# Patient Record
Sex: MALE | Race: WHITE | NOT HISPANIC OR LATINO | Employment: FULL TIME
[De-identification: names, ages, dates, MRNs, and addresses within clinical notes are randomized per-mention and may not be internally consistent; named-entity substitution may affect disease eponyms.]

---

## 2022-07-14 ENCOUNTER — TRANSCRIBE ORDERS (OUTPATIENT)
Dept: OTHER | Age: 67
End: 2022-07-14

## 2022-07-14 DIAGNOSIS — M54.41 CHRONIC RIGHT-SIDED LOW BACK PAIN WITH RIGHT-SIDED SCIATICA: Primary | ICD-10-CM

## 2022-07-14 DIAGNOSIS — G89.29 CHRONIC RIGHT-SIDED LOW BACK PAIN WITH RIGHT-SIDED SCIATICA: Primary | ICD-10-CM

## 2022-07-27 ENCOUNTER — THERAPY VISIT (OUTPATIENT)
Dept: PHYSICAL THERAPY | Facility: CLINIC | Age: 67
End: 2022-07-27
Attending: FAMILY MEDICINE
Payer: COMMERCIAL

## 2022-07-27 DIAGNOSIS — G89.29 CHRONIC RIGHT-SIDED LOW BACK PAIN WITH RIGHT-SIDED SCIATICA: ICD-10-CM

## 2022-07-27 DIAGNOSIS — M54.41 CHRONIC RIGHT-SIDED LOW BACK PAIN WITH RIGHT-SIDED SCIATICA: ICD-10-CM

## 2022-07-27 PROCEDURE — 97110 THERAPEUTIC EXERCISES: CPT | Mod: GP | Performed by: PHYSICAL THERAPIST

## 2022-07-27 PROCEDURE — 97161 PT EVAL LOW COMPLEX 20 MIN: CPT | Mod: GP | Performed by: PHYSICAL THERAPIST

## 2022-07-27 NOTE — PROGRESS NOTES
Physical Therapy Initial Evaluation  Subjective:  The history is provided by the patient. No  was used.   Therapist Generated HPI Evaluation  Problem details: A couple of year history of low back pain, possibly from lifting,  The pain comes and goes.  Now the pain is constant for the last year.   If I walk for 2 blocks, I have increase in pain in the leg. .         Type of problem:  Lumbar.    This is a chronic condition.  Condition occurred with:  Insidious onset.  Where condition occurred: for unknown reasons.  Patient reports pain:  Lower lumbar spine and lumbar spine right.  Pain is described as aching and is constant.  Pain radiates to:  Gluteals right, thigh right and knee right. Pain is worse in the P.M. (stiffness in the AM ).  Since onset symptoms are unchanged.  Symptoms are exacerbated by walking, standing and lifting  and relieved by rest.  Special tests included:  X-ray (1 year ago ).  Previous treatment includes physical therapy (left frozen shoulder).   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  Home/work barriers: apartment, lives with wife.    Patient Health History  William Pennington being seen for right low back and right leg pain.     Problem began: 7/14/2022.   Problem occurred: unknown   Pain is reported as 3/10 (walking 5-6/10) on pain scale.  General health as reported by patient is good.  Pertinent medical history includes: cancer and chemical dependency (hodgekin's lymphoma).   Red flags:  None as reported by patient.  Medical allergies: none. Other medical allergies details: unknown.   Surgeries include:  None.    Current medications:  None. Other medications details: vitamin.    Current occupation is IT  support .   Primary job tasks include:  Driving, computer work, lifting/carrying, prolonged sitting and repetitive tasks.                                    Objective:  Standing Alignment:    Cervical/Thoracic:  Forward head  (poor sitting posture)  Shoulder/UE:  Rounded shoulders (internal rotation of bilateral shoulders)  Lumbar:  High lumbar lordosis          General Deviations:  Toe out L and toe out R  Gait:      Deviations:  Hip:  Excessive flexion L and excessive flexion R    Flexibility/Screens:   Positive screens:  LumbarNegative screens: Hip (tightness)     Lower Extremity:  Decreased left lower extremity flexibility:Hip Flexors; Quadriceps and Hamstrings    Decreased right lower extremity flexibility:  Hip Flexors; Quadriceps and Hamstrings  Spine:  Decreased left spine flexibility:  Quadratus Lumborum    Decreased right spine flexibility:  Quadratus Lumborum             Lumbar/SI Evaluation  ROM:    AROM Lumbar:   Flexion:        Toes increase in right leg pain   Ext:                    Minimal-moderate movement  decrease in pain    Side Bend:        Left:     Right:   Rotation:           Left:     Right:   Side Glide:        Left:     Right:           Lumbar Myotomes:    T12-L3 (Hip Flex):  Left: 5    Right: 5  L2-4 (Quads):  Left:  5    Right:  5  L4 (Ankle DF):  Left:  5    Right:  5    S1 (Toe Raise):  Left: 5    Right: 5        Neural Tension/Mobility:      Left side:Slump  negative.     Right side:   Slump  negative.   Lumbar Palpation:    Tenderness present at Left:    Quadratus Lumborum; Erector Spinae and Vertebral  Tenderness present at Right: Quadratus Lumborum; Erector Spinae and Vertebral    Lumbar Provocation:    Left positive with:  Mobility and PROM hip (tightness with hip extension )  Right positive with: Mobility and PROM hip (extreme hip flexion tightness)  Spinal Segmental Conclusions:     Level: Hypo noted at L2, L3, L4 and L5                                        Hip Evaluation  Hip PROM:      Extension: Left: unable to get hip straight    Right: unable to get get the hip straight                                        General     ROS    Assessment/Plan:    Patient is a 66 year old male with lumbar  complaints.    Patient has the following significant findings with corresponding treatment plan.                Diagnosis 1:  Right low lumbar with hip extension   Pain -  manual therapy, self management, education, directional preference exercise and home program  Decreased ROM/flexibility - manual therapy, therapeutic exercise, therapeutic activity and home program  Impaired balance - neuro re-education, therapeutic activities and home program  Impaired gait - gait training and home program  Impaired muscle performance - neuro re-education and home program  Decreased function - therapeutic activities and home program  Impaired posture - neuro re-education and home program    Therapy Evaluation Codes:   Cumulative Therapy Evaluation is: Low complexity.    Previous and current functional limitations:  (See Goal Flow Sheet for this information)    Short term and Long term goals: (See Goal Flow Sheet for this information)     Communication ability:  Patient appears to be able to clearly communicate and understand verbal and written communication and follow directions correctly.  Treatment Explanation - The following has been discussed with the patient:   RX ordered/plan of care  This patient would benefit from PT intervention to resume normal activities.   Rehab potential is good.    Frequency:  1 X week, once daily  Duration:  for 4 weeks and 1x/ every other week for 4 weeks   Discharge Plan:  Achieve all LTG.  Independent in home treatment program.    Please refer to the daily flowsheet for treatment today, total treatment time and time spent performing 1:1 timed codes.

## 2022-07-28 PROBLEM — M54.41 CHRONIC RIGHT-SIDED LOW BACK PAIN WITH RIGHT-SIDED SCIATICA: Status: ACTIVE | Noted: 2022-07-28

## 2022-07-28 PROBLEM — G89.29 CHRONIC RIGHT-SIDED LOW BACK PAIN WITH RIGHT-SIDED SCIATICA: Status: ACTIVE | Noted: 2022-07-28

## 2022-07-29 NOTE — PROGRESS NOTES
[facility]    OUTPATIENT Physical Therapy ORTHOPEDIC EVALUATION  PLAN OF TREATMENT FOR OUTPATIENT REHABILITATION  (COMPLETE FOR INITIAL CLAIMS ONLY)  Patient's Last Name, First Name, M.I.  YOB: 1955  William Pennington    Provider s Name:  [facility]   Medical Record No.  [id number]   Start of Care Date:  07/27/22   Onset Date:   07/14/22   Type:     _X__PT   ___OT Medical Diagnosis:    Encounter Diagnosis   Name Primary?    Chronic right-sided low back pain with right-sided sciatica         Treatment Diagnosis:  right low back        Goals:     07/27/22 0500   Body Part   Goals listed below are for right low back and right leg pain   Goal #1   Goal #1 ambulation   Previous Functional Level No restrictions   Current Functional Level Blocks patient can walk   Performance Level 2 , pl 5-6/10   STG Target Performance Blocks patient will be able to  walk   Performance Level 4 , pl 3-4/10   Rationale for safe household ambulation;for safe outdoor household ambulation;for safe community ambulation   Due Date 08/24/22    LTG Target Performance Blocks patient will be able to walk   Performance Level 8, pl 2-3/10   Rationale for safe household ambulation;for safe outdoor household ambulation;for safe community ambulation;to maintain proper body mechanics/posture while ambulating to avoid additional compensatory injury due to improper gait mechanics   Due Date 09/21/22       Therapy Frequency:  1x/ week the progress to every other week  Predicted Duration of Therapy Intervention:  8 weeks    Aretha Barnett, PT                 I CERTIFY THE NEED FOR THESE SERVICES FURNISHED UNDER        THIS PLAN OF TREATMENT AND WHILE UNDER MY CARE .             Physician Signature               Date    X_____________________________________________________                          Certification Date From:  07/27/22   Certification Date To:  09/21/22    Referring Provider:  Kosta Stiles    Initial Assessment        See Epic Evaluation SOC Date: 07/27/22

## 2022-08-12 ENCOUNTER — THERAPY VISIT (OUTPATIENT)
Dept: PHYSICAL THERAPY | Facility: CLINIC | Age: 67
End: 2022-08-12
Payer: COMMERCIAL

## 2022-08-12 DIAGNOSIS — M54.41 CHRONIC RIGHT-SIDED LOW BACK PAIN WITH RIGHT-SIDED SCIATICA: Primary | ICD-10-CM

## 2022-08-12 DIAGNOSIS — G89.29 CHRONIC RIGHT-SIDED LOW BACK PAIN WITH RIGHT-SIDED SCIATICA: Primary | ICD-10-CM

## 2022-08-12 PROCEDURE — 97110 THERAPEUTIC EXERCISES: CPT | Mod: GP | Performed by: PHYSICAL THERAPIST

## 2022-08-12 PROCEDURE — 97112 NEUROMUSCULAR REEDUCATION: CPT | Mod: GP | Performed by: PHYSICAL THERAPIST

## 2022-08-12 PROCEDURE — 97140 MANUAL THERAPY 1/> REGIONS: CPT | Mod: GP | Performed by: PHYSICAL THERAPIST

## 2022-08-19 ENCOUNTER — THERAPY VISIT (OUTPATIENT)
Dept: PHYSICAL THERAPY | Facility: CLINIC | Age: 67
End: 2022-08-19
Payer: COMMERCIAL

## 2022-08-19 DIAGNOSIS — M54.41 CHRONIC RIGHT-SIDED LOW BACK PAIN WITH RIGHT-SIDED SCIATICA: Primary | ICD-10-CM

## 2022-08-19 DIAGNOSIS — G89.29 CHRONIC RIGHT-SIDED LOW BACK PAIN WITH RIGHT-SIDED SCIATICA: Primary | ICD-10-CM

## 2022-08-19 PROCEDURE — 97140 MANUAL THERAPY 1/> REGIONS: CPT | Mod: GP | Performed by: PHYSICAL THERAPIST

## 2022-08-19 PROCEDURE — 97112 NEUROMUSCULAR REEDUCATION: CPT | Mod: GP | Performed by: PHYSICAL THERAPIST

## 2022-08-19 PROCEDURE — 97110 THERAPEUTIC EXERCISES: CPT | Mod: GP | Performed by: PHYSICAL THERAPIST

## 2022-09-02 ENCOUNTER — THERAPY VISIT (OUTPATIENT)
Dept: PHYSICAL THERAPY | Facility: CLINIC | Age: 67
End: 2022-09-02
Payer: COMMERCIAL

## 2022-09-02 DIAGNOSIS — M54.41 CHRONIC RIGHT-SIDED LOW BACK PAIN WITH RIGHT-SIDED SCIATICA: Primary | ICD-10-CM

## 2022-09-02 DIAGNOSIS — G89.29 CHRONIC RIGHT-SIDED LOW BACK PAIN WITH RIGHT-SIDED SCIATICA: Primary | ICD-10-CM

## 2022-09-02 PROCEDURE — 97112 NEUROMUSCULAR REEDUCATION: CPT | Mod: GP | Performed by: PHYSICAL THERAPIST

## 2022-09-02 PROCEDURE — 97140 MANUAL THERAPY 1/> REGIONS: CPT | Mod: GP | Performed by: PHYSICAL THERAPIST

## 2022-09-02 PROCEDURE — 97110 THERAPEUTIC EXERCISES: CPT | Mod: GP | Performed by: PHYSICAL THERAPIST

## 2022-09-02 NOTE — LETTER
[facility]  [address]  [address]  [place] [address]  [phone]    2022    Re: William Pennington   :   1955  MRN:  [id number]   REFERRING PHYSICIAN:   Kosta MILIAN [facility]    Date of Initial Evaluation:  2022  Visits:  Rxs Used: 4  Reason for Referral:  Chronic right-sided low back pain with right-sided sciatica     DISCHARGE REPORT    Progress reporting period is from 2022 to 2022.       SUBJECTIVE  Subjective changes noted by patient:   Overall still getting better.  I am about 75% of my normal.  I don't have pain at rest.     Current Pain level: 2/10.      Initial Pain level: 5/10.   Changes in function:  Yes (See Goal flowsheet attached for changes in current functional level)  Adverse reaction to treatment or activity: None    OBJECTIVE  Changes noted in objective findings:  Yes, TROM flexion toes, extension moderate , SB WFL.  Increase awareness of posture and spine neutral.  TA 1/5, Tightness in L/E especially hip flexors.  Walking decrease in hip extension, watching back position.  Discussed with patient progression of exercises and home exercise program.     ASSESSMENT/PLAN  Updated problem list and treatment plan: Diagnosis 1:  Low back pain   Pain -  manual therapy, self management, education and home program  Decreased ROM/flexibility - manual therapy, therapeutic exercise, therapeutic activity and home program  Decreased joint mobility - manual therapy, therapeutic exercise, therapeutic activity and home program  Impaired gait - gait training and home program  Decreased function - therapeutic activities and functional performance testing  Impaired posture - neuro re-education and home program  STG/LTGs have been met or progress has been made towards goals:  Yes (See Goal flow sheet completed today.)  Assessment of Progress: The patient's condition  is improving.  The patient's condition has potential to improve.  Self Management Plans:  Patient has been instructed in a home treatment program.  Patient  has been instructed in self management of symptoms.    Recommendations:  Patient to try exercises on his own.  Patient to call if any questions.  Patient does have appointments left on original order and may return to therapy if unable to progress with exercises or if pain returns.  Thank you for the opportunity of working with this motivated individual.      Thank you for your referral.    INQUIRIES  Therapist: Aretha Barnett PT  [facility] [place]  [address] [address]  Phone: [phone]  Fax: [phone]

## 2022-09-02 NOTE — PROGRESS NOTES
Subjective:  HPI  Physical Exam                    Objective:  System    Physical Exam    General     ROS    Assessment/Plan:    DISCHARGE REPORT    Progress reporting period is from 7/27/2022 to 9/2/2022.       SUBJECTIVE  Subjective changes noted by patient:   Overall still getting better.  I am about 75% of my normal.  I don't have pain at rest.     Current Pain level: 2/10.      Initial Pain level: 5/10.   Changes in function:  Yes (See Goal flowsheet attached for changes in current functional level)  Adverse reaction to treatment or activity: None    OBJECTIVE  Changes noted in objective findings:  Yes, TROM flexion toes, extension moderate , SB WFL.  Increase awareness of posture and spine neutral.  TA 1/5, Tightness in L/E especially hip flexors.  Walking decrease in hip extension, watching back position.  Discussed with patient progression of exercises and home exercise program.       ASSESSMENT/PLAN  Updated problem list and treatment plan: Diagnosis 1:  Low back pain   Pain -  manual therapy, self management, education and home program  Decreased ROM/flexibility - manual therapy, therapeutic exercise, therapeutic activity and home program  Decreased joint mobility - manual therapy, therapeutic exercise, therapeutic activity and home program  Impaired gait - gait training and home program  Decreased function - therapeutic activities and functional performance testing  Impaired posture - neuro re-education and home program  STG/LTGs have been met or progress has been made towards goals:  Yes (See Goal flow sheet completed today.)  Assessment of Progress: The patient's condition is improving.  The patient's condition has potential to improve.  Self Management Plans:  Patient has been instructed in a home treatment program.  Patient  has been instructed in self management of symptoms.    Recommendations:  Patient to try exercises on his own.  Patient to call if any questions.  Patient does have appointments  left on original order and may return to therapy if unable to progress with exercises or if pain returns.  Thank you for the opportunity of working with this motivated individual.    Please refer to the daily flowsheet for treatment today, total treatment time and time spent performing 1:1 timed codes.

## 2022-10-15 ENCOUNTER — HEALTH MAINTENANCE LETTER (OUTPATIENT)
Age: 67
End: 2022-10-15

## 2022-11-30 PROBLEM — M54.41 CHRONIC RIGHT-SIDED LOW BACK PAIN WITH RIGHT-SIDED SCIATICA: Status: RESOLVED | Noted: 2022-07-28 | Resolved: 2022-11-30

## 2022-11-30 PROBLEM — G89.29 CHRONIC RIGHT-SIDED LOW BACK PAIN WITH RIGHT-SIDED SCIATICA: Status: RESOLVED | Noted: 2022-07-28 | Resolved: 2022-11-30

## 2023-08-20 ENCOUNTER — HEALTH MAINTENANCE LETTER (OUTPATIENT)
Age: 68
End: 2023-08-20

## 2024-10-13 ENCOUNTER — HEALTH MAINTENANCE LETTER (OUTPATIENT)
Age: 69
End: 2024-10-13